# Patient Record
(demographics unavailable — no encounter records)

---

## 2025-01-07 NOTE — PLAN
[FreeTextEntry1] : urine culture gc/ct urine  Rx Depo Provera 150 mg IM q 3 months return to office for injection  ER warnings given  follow up Gyn  prn

## 2025-01-07 NOTE — HISTORY OF PRESENT ILLNESS
[FreeTextEntry1] : 18 yo in for annual and postpartum follow up  S/P  10/2023 in [place]  Denies breast pain or breast mass   Patient is breastfeeding Denies abdominal pain or frequent constipation  Denies blood in stools Denies pelvic pain or vaginal discharge or bleeding LMP 3/2023  Denies any current complaints  Not sexually active   Patient counseled on bcm and she opts for injectable depo-provera q 3 months Rx to be sent and patient will    Return to office for injection  Denies tobacco, drug, or alcohol use

## 2025-01-07 NOTE — PHYSICAL EXAM
[Chaperone Present] : A chaperone was present in the examining room during all aspects of the physical examination [21434] : A chaperone was present during the pelvic exam. [FreeTextEntry2] : Ms KATALINA Rod [Appropriately responsive] : appropriately responsive [Alert] : alert [No Acute Distress] : no acute distress [No Lymphadenopathy] : no lymphadenopathy [Regular Rate Rhythm] : regular rate rhythm [No Murmurs] : no murmurs [Clear to Auscultation B/L] : clear to auscultation bilaterally [Soft] : soft [Non-tender] : non-tender [Non-distended] : non-distended [No HSM] : No HSM [No Lesions] : no lesions [No Mass] : no mass [Oriented x3] : oriented x3 [Examination Of The Breasts] : a normal appearance [No Masses] : no breast masses were palpable [Labia Majora] : normal [Labia Minora] : normal [Normal] : normal [Uterine Adnexae] : normal [Declined] : Patient declined rectal exam

## 2025-04-15 NOTE — DISCUSSION/SUMMARY
[FreeTextEntry1] : Risks, benefits and side effects of Depo Provera reviewed and consent obtained. Medroxyprogesterone 150 mg given IM in left deltoid NDC: 71301-112-02 Lot# [id number] Exp:10/31/2028  Patient tolerated injection well Depo Provera should not be used as long-term contraception as it can decrease bone density and patient to eat a calcium rich diet of at least 1000 mg daily while on this medication.  She was provided with a list of calcium reach foods.  If she cannot achieve calcium requirement through diet she may take a supplement RTO x 3 months for next injection Patient verbalizes understanding of and agreement with this plan.  All questions answered to patient's satisfaction.

## 2025-04-15 NOTE — HISTORY OF PRESENT ILLNESS
[FreeTextEntry1] : 18 yo for repeat Depo Provera injection today.  Last injection 1/7/25 and she had no adverse reactions to this.

## 2025-07-18 NOTE — HISTORY OF PRESENT ILLNESS
[FreeTextEntry1] : 21 yo for repeat Depo Provera injection today. Last injection 4/15/25 and she had no adverse reactions to this.

## 2025-07-18 NOTE — DISCUSSION/SUMMARY
[FreeTextEntry1] : Risks, benefits and side effects of Depo Provera reviewed and consent obtained. Medroxyprogesterone 150 mg given IM in left deltoid NDC: Lot# Exp:  Patient tolerated injection well Depo Provera should not be used as long-term contraception as it can decrease bone density and patient to eat a calcium rich diet of at least 1000 mg daily while on this medication.  She was provided with a list of calcium reach foods.  If she cannot achieve calcium requirement through diet she may take a supplement RTO x 3 months for next injection Patient verbalizes understanding of and agreement with this plan.  All questions answered to patient's satisfaction.